# Patient Record
Sex: FEMALE | Race: WHITE | NOT HISPANIC OR LATINO | URBAN - METROPOLITAN AREA
[De-identification: names, ages, dates, MRNs, and addresses within clinical notes are randomized per-mention and may not be internally consistent; named-entity substitution may affect disease eponyms.]

---

## 2023-03-07 LAB
SARS-COV-2 RESULT: NOT DETECTED
SARS-COV-2 RESULT: NOT DETECTED

## 2023-12-28 ENCOUNTER — OFFICE VISIT (OUTPATIENT)
Dept: OBSTETRICS AND GYNECOLOGY | Facility: CLINIC | Age: 28
End: 2023-12-28
Payer: COMMERCIAL

## 2023-12-28 ENCOUNTER — LAB (OUTPATIENT)
Dept: LAB | Facility: LAB | Age: 28
End: 2023-12-28
Payer: COMMERCIAL

## 2023-12-28 VITALS
DIASTOLIC BLOOD PRESSURE: 68 MMHG | BODY MASS INDEX: 24.32 KG/M2 | WEIGHT: 146 LBS | HEIGHT: 65 IN | SYSTOLIC BLOOD PRESSURE: 124 MMHG

## 2023-12-28 DIAGNOSIS — Z30.40 ENCOUNTER FOR SURVEILLANCE OF CONTRACEPTIVES, UNSPECIFIED: ICD-10-CM

## 2023-12-28 DIAGNOSIS — L65.9 HAIR LOSS: ICD-10-CM

## 2023-12-28 DIAGNOSIS — Z01.419 WELL WOMAN EXAM WITH ROUTINE GYNECOLOGICAL EXAM: Primary | ICD-10-CM

## 2023-12-28 DIAGNOSIS — Z12.4 SCREENING FOR MALIGNANT NEOPLASM OF CERVIX: ICD-10-CM

## 2023-12-28 LAB
ERYTHROCYTE [DISTWIDTH] IN BLOOD BY AUTOMATED COUNT: 11.9 % (ref 11.5–14.5)
HCT VFR BLD AUTO: 43.9 % (ref 36–46)
HGB BLD-MCNC: 14.6 G/DL (ref 12–16)
IRON SATN MFR SERPL: 34 % (ref 25–45)
IRON SERPL-MCNC: 144 UG/DL (ref 35–150)
MCH RBC QN AUTO: 30.4 PG (ref 26–34)
MCHC RBC AUTO-ENTMCNC: 33.3 G/DL (ref 32–36)
MCV RBC AUTO: 92 FL (ref 80–100)
NRBC BLD-RTO: 0 /100 WBCS (ref 0–0)
PLATELET # BLD AUTO: 180 X10*3/UL (ref 150–450)
RBC # BLD AUTO: 4.8 X10*6/UL (ref 4–5.2)
TIBC SERPL-MCNC: 419 UG/DL (ref 240–445)
TSH SERPL-ACNC: 1.31 MIU/L (ref 0.44–3.98)
UIBC SERPL-MCNC: 275 UG/DL (ref 110–370)
VIT B12 SERPL-MCNC: 223 PG/ML (ref 211–911)
WBC # BLD AUTO: 5.2 X10*3/UL (ref 4.4–11.3)

## 2023-12-28 PROCEDURE — 99395 PREV VISIT EST AGE 18-39: CPT | Performed by: OBSTETRICS & GYNECOLOGY

## 2023-12-28 PROCEDURE — 85027 COMPLETE CBC AUTOMATED: CPT

## 2023-12-28 PROCEDURE — 84443 ASSAY THYROID STIM HORMONE: CPT

## 2023-12-28 PROCEDURE — 88175 CYTOPATH C/V AUTO FLUID REDO: CPT

## 2023-12-28 PROCEDURE — 1036F TOBACCO NON-USER: CPT | Performed by: OBSTETRICS & GYNECOLOGY

## 2023-12-28 PROCEDURE — 83540 ASSAY OF IRON: CPT

## 2023-12-28 PROCEDURE — 82607 VITAMIN B-12: CPT

## 2023-12-28 PROCEDURE — 83550 IRON BINDING TEST: CPT

## 2023-12-28 PROCEDURE — 36415 COLL VENOUS BLD VENIPUNCTURE: CPT

## 2023-12-28 RX ORDER — NORETHINDRONE ACETATE AND ETHINYL ESTRADIOL, ETHINYL ESTRADIOL AND FERROUS FUMARATE 1MG-10(24)
1 KIT ORAL DAILY
Qty: 84 TABLET | Refills: 3 | Status: SHIPPED | OUTPATIENT
Start: 2023-12-28

## 2023-12-28 RX ORDER — SUMATRIPTAN SUCCINATE 100 MG/1
TABLET ORAL
COMMUNITY

## 2023-12-28 RX ORDER — AMITRIPTYLINE HYDROCHLORIDE 10 MG/1
10 TABLET, FILM COATED ORAL NIGHTLY
COMMUNITY

## 2023-12-28 RX ORDER — ONDANSETRON 4 MG/1
TABLET, FILM COATED ORAL
COMMUNITY

## 2023-12-28 RX ORDER — METHYLPHENIDATE 17.3 MG/1
1 TABLET, ORALLY DISINTEGRATING ORAL
COMMUNITY

## 2023-12-28 NOTE — PROGRESS NOTES
"Subjective   Sylvia Mckinney is a 28 y.o. female who is here for a routine well woman exam.     PAP 8-29-22 ASCUS, HPV NEG  STD SCREEN 8-12-21 NEG  GARDASIL X2    Feeling ok today.   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     Concerned about hair loss. Feels like it has been a lot more than usual.    Last year changed her to Lo-loestrin due to menstrual headaches/migraines.  Headaches much better now. Doesn't really get much of a period.     No pelvic pain or vaginal issues.   History of molluscum, no issues since dermatology freezing.     No breast concerns.    Family history of uterine or ovarian cancer: no  Family history of breast cancer: no    ROS:  Constitutional: Negative  Eyes: Negative  HEENT: Negative  Respiratory: Negative  Cardiovascular: Negative  GI: Negative  : Negative  Endocrine: Negative  Breast: Negative  Musculoskeletal: Negative  Skin: +hair loss  Hematologic: Negative  Neurologic: Negative  Psychiatric: Negative     Objective   /68   Ht 1.651 m (5' 5\")   Wt 66.2 kg (146 lb)   BMI 24.30 kg/m²     Constitutional: Alert and in no acute distress.   Pulmonary: No respiratory distress.   Chest: Breasts: Normal appearance, no nipple discharge, and no skin changes. Palpation of breasts and axillae: No palpable mass and no axillary lymphadenopathy.  Abdomen: Soft, nontender; no abdominal mass palpated.   Genitourinary:   External genitalia: Normal appearance.  No inguinal lymphadenopathy.   Bartholin's, Urethral, and Skenes Glands: Normal.   Urethra: Normal. Bladder: Normal on palpation.   Vagina: Normal.   Cervix: Normal appearance, nontender.   Uterus/Adnexa: Normal size, mobile uterus. Nontender, no masses palpated in adnexa  Inspection of perianal area: Normal.  Musculoskeletal: No joint swelling seen, normal movements of all extremities.  Skin: Normal skin color and pigmentation, normal skin turgor, and no rash.  Psychiatric: Alert and oriented x 3. Affect " normal to patient's baseline. Mood: Appropriate.     Assessment/Plan   1. Well woman exam with routine gynecological exam  Breast and pelvic exam findings today were normal.  Explained the findings of her last pap (ASCUS, HPV negative). Explained why a pap test this year is not required. However, she is nervous would prefer repeat testing, so Pap testing was done today. She will be notified of the result.  She was given an order for labs to evaluate possible reasons for hair loss. Did not check Vitamin D since she has already a known too high level of this.  She will continue Lo-Loestrin for contraception.  Advised her to call with any new problems or questions.  Follow up in 1 year for the next well woman visit.      2. Screening for malignant neoplasm of cervix  - THINPREP PAP TEST    3. Hair loss  - CBC; Future  - TSH with reflex to Free T4 if abnormal; Future  - Iron and TIBC; Future  - Vitamin B12; Future

## 2024-01-16 LAB
CYTOLOGY CMNT CVX/VAG CYTO-IMP: NORMAL
LAB AP HPV GENOTYPE QUESTION: YES
LAB AP HPV HR: NORMAL
LAB AP PREVIOUS ABNORMAL HISTORY: NORMAL
LABORATORY COMMENT REPORT: NORMAL
PATH REPORT.TOTAL CANCER: NORMAL

## 2025-01-06 DIAGNOSIS — Z30.40 ENCOUNTER FOR SURVEILLANCE OF CONTRACEPTIVES, UNSPECIFIED: ICD-10-CM

## 2025-01-06 RX ORDER — NORETHINDRONE ACETATE AND ETHINYL ESTRADIOL, ETHINYL ESTRADIOL AND FERROUS FUMARATE 1MG-10(24)
1 KIT ORAL DAILY
Qty: 84 TABLET | Refills: 0 | Status: SHIPPED | OUTPATIENT
Start: 2025-01-06

## 2025-01-06 NOTE — TELEPHONE ENCOUNTER
LAST ANNUAL 12/28/23  DR WILLIAMSON  PATIENT REQUESTING A REFILL FOR BIRTH CONTROL.  ACCORDING TO CHART MEDICATION WAS SENT TO PHARMACY TODAY

## 2025-01-23 ENCOUNTER — APPOINTMENT (OUTPATIENT)
Dept: OBSTETRICS AND GYNECOLOGY | Facility: CLINIC | Age: 30
End: 2025-01-23
Payer: COMMERCIAL

## 2025-01-23 VITALS
DIASTOLIC BLOOD PRESSURE: 60 MMHG | SYSTOLIC BLOOD PRESSURE: 122 MMHG | WEIGHT: 155 LBS | HEIGHT: 65 IN | BODY MASS INDEX: 25.83 KG/M2

## 2025-01-23 DIAGNOSIS — Z30.41 ORAL CONTRACEPTIVE USE: ICD-10-CM

## 2025-01-23 DIAGNOSIS — Z01.419 WELL WOMAN EXAM WITH ROUTINE GYNECOLOGICAL EXAM: Primary | ICD-10-CM

## 2025-01-23 PROBLEM — E67.3 HYPERVITAMINOSIS D: Status: RESOLVED | Noted: 2020-10-29 | Resolved: 2025-01-23

## 2025-01-23 PROBLEM — F90.9 ATTENTION DEFICIT HYPERACTIVITY DISORDER (ADHD): Status: ACTIVE | Noted: 2020-11-10

## 2025-01-23 PROBLEM — I73.00 RAYNAUD PHENOMENON: Status: RESOLVED | Noted: 2020-11-10 | Resolved: 2025-01-23

## 2025-01-23 PROBLEM — F41.9 ANXIETY DISORDER: Status: ACTIVE | Noted: 2020-11-10

## 2025-01-23 PROBLEM — G43.909 MIGRAINE: Status: RESOLVED | Noted: 2025-01-23 | Resolved: 2025-01-23

## 2025-01-23 PROBLEM — K76.89 HEPATIC FOCAL NODULAR HYPERPLASIA: Status: RESOLVED | Noted: 2020-10-29 | Resolved: 2025-01-23

## 2025-01-23 PROBLEM — B08.1 MOLLUSCUM CONTAGIOSUM: Status: RESOLVED | Noted: 2025-01-23 | Resolved: 2025-01-23

## 2025-01-23 PROCEDURE — 1036F TOBACCO NON-USER: CPT | Performed by: OBSTETRICS & GYNECOLOGY

## 2025-01-23 PROCEDURE — 99395 PREV VISIT EST AGE 18-39: CPT | Performed by: OBSTETRICS & GYNECOLOGY

## 2025-01-23 PROCEDURE — 3008F BODY MASS INDEX DOCD: CPT | Performed by: OBSTETRICS & GYNECOLOGY

## 2025-01-23 RX ORDER — NORETHINDRONE ACETATE AND ETHINYL ESTRADIOL, ETHINYL ESTRADIOL AND FERROUS FUMARATE 1MG-10(24)
1 KIT ORAL DAILY
Qty: 84 TABLET | Refills: 3 | Status: CANCELLED | OUTPATIENT
Start: 2025-01-23

## 2025-01-23 RX ORDER — DROSPIRENONE AND ETHINYL ESTRADIOL 0.02-3(28)
1 KIT ORAL DAILY
Qty: 84 TABLET | Refills: 3 | Status: SHIPPED | OUTPATIENT
Start: 2025-01-23 | End: 2026-01-23

## 2025-01-23 NOTE — PROGRESS NOTES
"Subjective   Sylvia Mckinney is a 29 y.o. female who is here for a routine well woman exam.     PAP 12/28/23 NEG  8-29-22 ASCUS, HPV NEG  STD SCREEN 8-12-21 NEG  GARDASIL X2     Feeling ok today.   Biggest concern since last year is weight gain.  No change in eating habits.  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 No other regular exercise.    Gets some bloating, no other bowel problems.     2 years ago changed her to Lo-loestrin due to menstrual headaches/migraines.  Headaches much better now. Doesn't really get much of a period.     No pelvic pain or vaginal issues.      No breast concerns.     Family history of uterine or ovarian cancer: no  Family history of breast cancer: no       ROS:  Constitutional: + weight gain  Eyes: Negative  HEENT: Negative  Respiratory: Negative  Cardiovascular: Negative  GI: Negative  : Negative  Endocrine: Negative  Breast: Negative  Musculoskeletal: Negative  Skin: Negative  Hematologic: Negative  Neurologic: Negative  Psychiatric: Negative     Objective   /60   Ht 1.651 m (5' 5\")   Wt 70.3 kg (155 lb)   LMP  (LMP Unknown)   BMI 25.79 kg/m²     Constitutional: Alert and in no acute distress.     Pulmonary: No respiratory distress.     Chest: Breasts: Normal appearance, no nipple discharge, and no skin changes. Palpation of breasts and axillae: No palpable mass and no axillary lymphadenopathy.    Abdomen: Soft, nontender; no abdominal mass palpated.     Genitourinary:   External genitalia: Normal appearance.  No inguinal lymphadenopathy.   Bartholin's, Urethral, and Skenes Glands: Normal.   Urethra: Normal. Bladder: Normal on palpation.   Vagina: Normal.   Cervix: Normal appearance, nontender.   Uterus/Adnexa: Normal size, mobile uterus. Nontender, no masses palpated in adnexa  Inspection of perianal area: Normal.    Musculoskeletal: No joint swelling seen, normal movements of all extremities.    Skin: Normal skin color and pigmentation, " normal skin turgor, and no rash.    Psychiatric: Alert and oriented x 3. Affect normal to patient's baseline. Mood: Appropriate.       Assessment/Plan   1. Well woman exam with routine gynecological exam (Primary)  2. Oral contraceptive use  - drospirenone-ethinyl estradiol ([place], 28,) 3-0.02 mg tablet; Take 1 tablet by mouth once daily.  Dispense: 84 tablet; Refill: 3    Breast and pelvic exam done today. Normal findings.  Pap testing not indicated this year.   Discussed possible causes of weight gain; may be medication, activity level, stress, hormone changes, GI.  Will try a different pill with a new progesterone to see if this helps; if not can go back to Lo-Loestrin.  Advised her to call with any new problems or questions.  Follow up in 1 year for the next well woman visit.

## 2025-05-12 DIAGNOSIS — Z30.40 ENCOUNTER FOR SURVEILLANCE OF CONTRACEPTIVES, UNSPECIFIED: ICD-10-CM

## 2025-05-12 RX ORDER — NORETHINDRONE ACETATE AND ETHINYL ESTRADIOL, ETHINYL ESTRADIOL AND FERROUS FUMARATE 1MG-10(24)
1 KIT ORAL DAILY
Qty: 84 TABLET | Refills: 1 | Status: SHIPPED | OUTPATIENT
Start: 2025-05-12